# Patient Record
Sex: FEMALE | Race: WHITE | NOT HISPANIC OR LATINO
[De-identification: names, ages, dates, MRNs, and addresses within clinical notes are randomized per-mention and may not be internally consistent; named-entity substitution may affect disease eponyms.]

---

## 2019-04-10 PROBLEM — Z00.00 ENCOUNTER FOR PREVENTIVE HEALTH EXAMINATION: Status: ACTIVE | Noted: 2019-04-10

## 2019-04-12 ENCOUNTER — APPOINTMENT (OUTPATIENT)
Dept: THORACIC SURGERY | Facility: CLINIC | Age: 69
End: 2019-04-12
Payer: MEDICARE

## 2019-04-12 VITALS
OXYGEN SATURATION: 99 % | WEIGHT: 241 LBS | HEIGHT: 60 IN | DIASTOLIC BLOOD PRESSURE: 80 MMHG | HEART RATE: 76 BPM | SYSTOLIC BLOOD PRESSURE: 130 MMHG | BODY MASS INDEX: 47.32 KG/M2

## 2019-04-12 DIAGNOSIS — Z87.42 PERSONAL HISTORY OF OTHER DISEASES OF THE FEMALE GENITAL TRACT: ICD-10-CM

## 2019-04-12 DIAGNOSIS — Z86.39 PERSONAL HISTORY OF OTHER ENDOCRINE, NUTRITIONAL AND METABOLIC DISEASE: ICD-10-CM

## 2019-04-12 DIAGNOSIS — Z87.39 PERSONAL HISTORY OF OTHER DISEASES OF THE MUSCULOSKELETAL SYSTEM AND CONNECTIVE TISSUE: ICD-10-CM

## 2019-04-12 DIAGNOSIS — Z87.891 PERSONAL HISTORY OF NICOTINE DEPENDENCE: ICD-10-CM

## 2019-04-12 DIAGNOSIS — Z87.19 PERSONAL HISTORY OF OTHER DISEASES OF THE DIGESTIVE SYSTEM: ICD-10-CM

## 2019-04-12 DIAGNOSIS — Z86.79 PERSONAL HISTORY OF OTHER DISEASES OF THE CIRCULATORY SYSTEM: ICD-10-CM

## 2019-04-12 DIAGNOSIS — Z87.898 PERSONAL HISTORY OF OTHER SPECIFIED CONDITIONS: ICD-10-CM

## 2019-04-12 DIAGNOSIS — R91.1 SOLITARY PULMONARY NODULE: ICD-10-CM

## 2019-04-12 PROCEDURE — 99204 OFFICE O/P NEW MOD 45 MIN: CPT

## 2019-04-12 RX ORDER — LOSARTAN POTASSIUM 100 MG/1
TABLET, FILM COATED ORAL
Refills: 0 | Status: ACTIVE | COMMUNITY

## 2019-04-12 RX ORDER — CARVEDILOL 25 MG/1
25 TABLET, FILM COATED ORAL
Refills: 0 | Status: ACTIVE | COMMUNITY

## 2019-04-12 RX ORDER — AMLODIPINE BESYLATE 5 MG/1
5 TABLET ORAL
Refills: 0 | Status: ACTIVE | COMMUNITY

## 2019-04-12 RX ORDER — ASPIRIN 81 MG
81 TABLET, DELAYED RELEASE (ENTERIC COATED) ORAL
Refills: 0 | Status: ACTIVE | COMMUNITY

## 2019-04-12 NOTE — PHYSICAL EXAM
[General Appearance - Alert] : alert [General Appearance - In No Acute Distress] : in no acute distress [PERRL With Normal Accommodation] : pupils were equal in size, round, and reactive to light [Sclera] : the sclera and conjunctiva were normal [Extraocular Movements] : extraocular movements were intact [Outer Ear] : the ears and nose were normal in appearance [Oropharynx] : the oropharynx was normal [Neck Cervical Mass (___cm)] : no neck mass was observed [Neck Appearance] : the appearance of the neck was normal [Jugular Venous Distention Increased] : there was no jugular-venous distention [Thyroid Nodule] : there were no palpable thyroid nodules [Thyroid Diffuse Enlargement] : the thyroid was not enlarged [Heart Rate And Rhythm] : heart rate was normal and rhythm regular [Auscultation Breath Sounds / Voice Sounds] : lungs were clear to auscultation bilaterally [Murmurs] : no murmurs [Heart Sounds] : normal S1 and S2 [Heart Sounds Gallop] : no gallops [Heart Sounds Pericardial Friction Rub] : no pericardial rub [Examination Of The Chest] : the chest was normal in appearance [Diminished Respiratory Excursion] : normal chest expansion [Chest Visual Inspection Thoracic Asymmetry] : no chest asymmetry [Bowel Sounds] : normal bowel sounds [Abdomen Tenderness] : non-tender [Abdomen Mass (___ Cm)] : no abdominal mass palpated [Abdomen Soft] : soft [Cervical Lymph Nodes Enlarged Anterior Bilaterally] : anterior cervical [Supraclavicular Lymph Nodes Enlarged Bilaterally] : supraclavicular [Cervical Lymph Nodes Enlarged Posterior Bilaterally] : posterior cervical [Axillary Lymph Nodes Enlarged Bilaterally] : axillary [Femoral Lymph Nodes Enlarged Bilaterally] : femoral [Inguinal Lymph Nodes Enlarged Bilaterally] : inguinal [No CVA Tenderness] : no ~M costovertebral angle tenderness [Nail Clubbing] : no clubbing  or cyanosis of the fingernails [Abnormal Walk] : normal gait [No Spinal Tenderness] : no spinal tenderness [Musculoskeletal - Swelling] : no joint swelling seen [Skin Color & Pigmentation] : normal skin color and pigmentation [Motor Tone] : muscle strength and tone were normal [] : no rash [Skin Turgor] : normal skin turgor [Deep Tendon Reflexes (DTR)] : deep tendon reflexes were 2+ and symmetric [Sensation] : the sensory exam was normal to light touch and pinprick [No Focal Deficits] : no focal deficits [Oriented To Time, Place, And Person] : oriented to person, place, and time [Affect] : the affect was normal [Impaired Insight] : insight and judgment were intact

## 2019-04-16 NOTE — HISTORY OF PRESENT ILLNESS
[FreeTextEntry1] : This is a 67 y/o F referred to our office after CT chest demonstrated incidental finding of 1.9 cm partialy solid ANNY nodule.  \par \par She had seen her PCP for routine visit and was sent for Ca scoring.  Imaging demonstrated lesion and patient was referred to Dr. Land for consultation.  He ultimately sent her for PET which showed no abnormal metabolic activity.  She was then referred to our office.\par \par Patient is currently asymptomatic.

## 2019-04-16 NOTE — ASSESSMENT
[FreeTextEntry1] : This is a 67 y/o F with pmhx of UC, HTN, obesity now with incidental finding of ANNY lung nodule.  All clinical options have been discussed with the patient and her .  Given the fact that the PET did not demonstrate any hypermetabolic lesions it is reasonable to repeat the CT chest in 3 months to evaluate for growth of the lesion.   If the nodule remains the same size or is enlarging she has expressed interest in scheduling a resection at that time. \par \par We will see the patient in the office after the ct scan in three months.

## 2019-04-16 NOTE — CONSULT LETTER
[Dear  ___] : Dear  [unfilled], [( Thank you for referring [unfilled] for consultation for _____ )] : Thank you for referring [unfilled] for consultation for [unfilled] [Please see my note below.] : Please see my note below. [Sincerely,] : Sincerely, [DrElissa  ___] : Dr. ULLOA [FreeTextEntry3] : Graeme Streeter MD\par Thoracic Surgery\par [facility]

## 2019-04-16 NOTE — DATA REVIEWED
[FreeTextEntry1] : PET: 4/1/19:  No abnormal metabolic activity associate dwith the clustered nodules in the posterolateral left upper lobe.\par

## 2019-06-28 ENCOUNTER — APPOINTMENT (OUTPATIENT)
Dept: THORACIC SURGERY | Facility: CLINIC | Age: 69
End: 2019-06-28
Payer: MEDICARE

## 2019-06-28 PROCEDURE — 99214 OFFICE O/P EST MOD 30 MIN: CPT

## 2019-06-28 NOTE — ASSESSMENT
[FreeTextEntry1] : This is a 69 y/o F with pmhx of UC, HTN, obesity now with incidental finding of ANNY lung nodule. All clinical options have been discussed with the patient and her . Given the fact that the PET did not demonstrate any hypermetabolic lesions it is reasonable to repeat the CT chest in 3 months to evaluate for growth of the lesion. If the nodule remains the same size or is enlarging she has expressed interest in scheduling a resection at that time. \par \par Lesion remains stable, there is a question of avm.  This was discussed with patient.  Will recommend repeat ct chest with IV contrast in 6 months, cta.

## 2019-06-28 NOTE — HISTORY OF PRESENT ILLNESS
[FreeTextEntry1] : This is a 69 y/o F referred to our office after CT chest demonstrated incidental finding of 1.9 cm partialy solid ANNY nodule. \par \par She had seen her PCP for routine visit and was sent for Ca scoring. Imaging demonstrated lesion and patient was referred to Dr. Land for consultation. He ultimately sent her for PET which showed no abnormal metabolic activity. She was then referred to our office.\par \par Patient is currently asymptomatic.\par \par Repeat ct chest shows no change in nodule and concern for possible av malformation.  No other abnormalities seen.

## 2019-06-28 NOTE — PHYSICAL EXAM
[Sclera] : the sclera and conjunctiva were normal [PERRL With Normal Accommodation] : pupils were equal in size, round, and reactive to light [Extraocular Movements] : extraocular movements were intact [Neck Appearance] : the appearance of the neck was normal [Neck Cervical Mass (___cm)] : no neck mass was observed [Jugular Venous Distention Increased] : there was no jugular-venous distention [Thyroid Diffuse Enlargement] : the thyroid was not enlarged [Thyroid Nodule] : there were no palpable thyroid nodules [Auscultation Breath Sounds / Voice Sounds] : lungs were clear to auscultation bilaterally [Heart Rate And Rhythm] : heart rate was normal and rhythm regular [Heart Sounds] : normal S1 and S2 [Heart Sounds Gallop] : no gallops [Murmurs] : no murmurs [Heart Sounds Pericardial Friction Rub] : no pericardial rub [Examination Of The Chest] : the chest was normal in appearance [Chest Visual Inspection Thoracic Asymmetry] : no chest asymmetry [Diminished Respiratory Excursion] : normal chest expansion [Bowel Sounds] : normal bowel sounds [Abdomen Soft] : soft [Abdomen Tenderness] : non-tender [Abdomen Mass (___ Cm)] : no abdominal mass palpated [Cervical Lymph Nodes Enlarged Posterior Bilaterally] : posterior cervical [Cervical Lymph Nodes Enlarged Anterior Bilaterally] : anterior cervical [Supraclavicular Lymph Nodes Enlarged Bilaterally] : supraclavicular [Axillary Lymph Nodes Enlarged Bilaterally] : axillary [Femoral Lymph Nodes Enlarged Bilaterally] : femoral [Inguinal Lymph Nodes Enlarged Bilaterally] : inguinal [Skin Color & Pigmentation] : normal skin color and pigmentation [Skin Turgor] : normal skin turgor [] : no rash [Deep Tendon Reflexes (DTR)] : deep tendon reflexes were 2+ and symmetric [Sensation] : the sensory exam was normal to light touch and pinprick [No Focal Deficits] : no focal deficits

## 2020-01-06 NOTE — PHYSICAL EXAM
[Sclera] : the sclera and conjunctiva were normal [PERRL With Normal Accommodation] : pupils were equal in size, round, and reactive to light [Extraocular Movements] : extraocular movements were intact [Neck Appearance] : the appearance of the neck was normal [Neck Cervical Mass (___cm)] : no neck mass was observed [Jugular Venous Distention Increased] : there was no jugular-venous distention [Thyroid Diffuse Enlargement] : the thyroid was not enlarged [Thyroid Nodule] : there were no palpable thyroid nodules [Auscultation Breath Sounds / Voice Sounds] : lungs were clear to auscultation bilaterally [Heart Sounds] : normal S1 and S2 [Heart Rate And Rhythm] : heart rate was normal and rhythm regular [Heart Sounds Gallop] : no gallops [Murmurs] : no murmurs [Examination Of The Chest] : the chest was normal in appearance [Heart Sounds Pericardial Friction Rub] : no pericardial rub [Chest Visual Inspection Thoracic Asymmetry] : no chest asymmetry [Diminished Respiratory Excursion] : normal chest expansion [Bowel Sounds] : normal bowel sounds [Abdomen Soft] : soft [Abdomen Tenderness] : non-tender [Cervical Lymph Nodes Enlarged Posterior Bilaterally] : posterior cervical [Abdomen Mass (___ Cm)] : no abdominal mass palpated [Cervical Lymph Nodes Enlarged Anterior Bilaterally] : anterior cervical [Supraclavicular Lymph Nodes Enlarged Bilaterally] : supraclavicular [Femoral Lymph Nodes Enlarged Bilaterally] : femoral [Axillary Lymph Nodes Enlarged Bilaterally] : axillary [Inguinal Lymph Nodes Enlarged Bilaterally] : inguinal [No CVA Tenderness] : no ~M costovertebral angle tenderness [No Spinal Tenderness] : no spinal tenderness [Musculoskeletal - Swelling] : no joint swelling seen [Abnormal Walk] : normal gait [Nail Clubbing] : no clubbing  or cyanosis of the fingernails [Motor Tone] : muscle strength and tone were normal [Skin Turgor] : normal skin turgor [Skin Color & Pigmentation] : normal skin color and pigmentation [] : no rash [Sensation] : the sensory exam was normal to light touch and pinprick [Deep Tendon Reflexes (DTR)] : deep tendon reflexes were 2+ and symmetric [No Focal Deficits] : no focal deficits [Oriented To Time, Place, And Person] : oriented to person, place, and time [Impaired Insight] : insight and judgment were intact [Affect] : the affect was normal

## 2020-01-07 ENCOUNTER — APPOINTMENT (OUTPATIENT)
Dept: THORACIC SURGERY | Facility: CLINIC | Age: 70
End: 2020-01-07
Payer: MEDICARE

## 2020-01-07 PROCEDURE — 99213 OFFICE O/P EST LOW 20 MIN: CPT

## 2020-01-08 NOTE — HISTORY OF PRESENT ILLNESS
[FreeTextEntry1] : 69 y/o F referred to our office after 1 year ago after CT chest demonstrated incidental finding of 1.9 cm partially solid ANNY nodule. \par \par She had seen her PCP for routine visit and was sent for Ca scoring. Imaging demonstrated lesion and patient was referred to Dr. Land for consultation. He ultimately sent her for PET in April 2019 which showed no abnormal metabolic activity. \par \par Repeat ct chest in June 2019 showed no change in nodule.  Repeat CT chest done last week also demonstrates no interval change in size of nodule.\par \par Patient is asymptomatic at this time.

## 2020-01-08 NOTE — ASSESSMENT
[FreeTextEntry1] : This is a 67 y/o F with pmhx of UC, HTN, obesity now with incidental finding of ANNY lung nodule which has no changed in size since April 2019 and demonstrates no hypermetabolic activity.Patient has been instructed to undergo CTA for further follow up in 6 months time.  She expresses understanding and agrees with the plan.

## 2020-01-08 NOTE — CONSULT LETTER
[Consult Letter:] : I had the pleasure of evaluating your patient, [unfilled]. [Dear  ___] : Dear  [unfilled], [Please see my note below.] : Please see my note below. [Consult Closing:] : Thank you very much for allowing me to participate in the care of this patient.  If you have any questions, please do not hesitate to contact me. [Sincerely,] : Sincerely, [FreeTextEntry3] : Graeme Streeter MD \par Thoracic Surgery\par [facility]

## 2020-01-08 NOTE — DATA REVIEWED
[FreeTextEntry1] : CT Chest 1/2/2020:  Again a lobulated ANNY lesion demonstrating multiple nodular components and intervening lucencies again with associated mild architectural distortion and slight tenting of the adjacent major fissure.  It measures 1.9 x 1.8 x 1.7 cm in size and is unchanged since prior study.  There are again 2 pulmonary vessels coursing from the left hilum to the lesion, the possibility of a vascular malformation should be considered.

## 2021-04-16 ENCOUNTER — APPOINTMENT (OUTPATIENT)
Dept: VASCULAR SURGERY | Facility: CLINIC | Age: 71
End: 2021-04-16
Payer: MEDICARE

## 2021-04-16 DIAGNOSIS — I83.899 VARICOSE VEINS OF UNSPECIFIED LOWER EXTREMITY WITH OTHER COMPLICATIONS: ICD-10-CM

## 2021-04-16 PROCEDURE — 99072 ADDL SUPL MATRL&STAF TM PHE: CPT

## 2021-04-16 PROCEDURE — 93971 EXTREMITY STUDY: CPT

## 2021-04-16 PROCEDURE — 99204 OFFICE O/P NEW MOD 45 MIN: CPT

## 2021-04-16 NOTE — PHYSICAL EXAM
[Normal Breath Sounds] : Normal breath sounds [Normal Heart Sounds] : normal heart sounds [Normal Rate and Rhythm] : normal rate and rhythm [2+] : left 2+ [Ankle Swelling (On Exam)] : present [Ankle Swelling Bilaterally] : bilaterally  [Varicose Veins Of Lower Extremities] : bilaterally [] : bilaterally [JVD] : no jugular venous distention  [Calm] : calm [de-identified] : NAD. Awake and alert.  Overweight. [de-identified] : NCAT  [de-identified] : Soft, NT, ND. No guarding. No palpable masses. No HSM [de-identified] : Normal muscle bulk and tone. [de-identified] : Bilateral large varicose veins greater than 3 mm in diameter.  Significant venous stasis changes with skin discoloration, hemochromatosis.  Healing right medial malleolus wound with a scab. [de-identified] : AAOx3, CN II-XII intact. Strength 5/5 in all 4 extremities. Sensation intact throughout.

## 2021-04-16 NOTE — REVIEW OF SYSTEMS
[As Noted in HPI] : as noted in HPI [Skin Wound] : skin wound [Negative] : Heme/Lymph [Skin Lesions] : no skin lesions

## 2021-04-16 NOTE — HISTORY OF PRESENT ILLNESS
[FreeTextEntry1] : 69 yo F with PMhx of hypertension, hyperlipidemia, obesity, extensive bilateral varicose veins with venous insufficiency presented with recurrent bleeding from right lower extremity varicose vein.  Patient states that she had history longstanding history of venous insufficiency and multiple interventions previously performed for bilateral lower extremity varicose veins.  On April 5 she developed significant bleeding from small varicose veins at the right ankle.  The bleeding was stopped with compression and sterile dressing was applied.  Patient experienced similar experience similar symptoms a few days ago again, requiring EMS intervention.  Since that episode patient performed leg compression and local wound care with Xeroform.\par \par Patient has extensive family history of bilateral lower extremity varicose veins in multiple family members including her mother.  She has been compliant with his compressive socks years without substantial relief of her symptoms.,  However symptoms substantially exacerbated in recent time.\par \par Prior to this episode patient had persistent burning tingling numbness and discomfort in bilateral lower extremity varicose veins.  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

## 2021-04-16 NOTE — ASSESSMENT
[FreeTextEntry1] : Patient is a 70-year-old female with bilateral lower extremity varicose veins not amenable to nonoperative management and progressively worse.  Patient experienced recently recurrent episodes of bleeding from a varicose vein of right lower extremity.\par \par Given progressive given progressive nature of the disease process we will proceed with radiofrequency ablation of refluxing greater and lesser saphenous vein on the right.  Patient will continue wound care.

## 2021-04-16 NOTE — PROCEDURE
[FreeTextEntry1] : Venous ultrasound was performed of right lower extremity demonstrated greater saphenous vein as well as lesser saphenous vein reflux.

## 2021-05-04 ENCOUNTER — APPOINTMENT (OUTPATIENT)
Dept: VASCULAR SURGERY | Facility: CLINIC | Age: 71
End: 2021-05-04
Payer: MEDICARE

## 2021-05-04 VITALS
BODY MASS INDEX: 47.32 KG/M2 | HEIGHT: 60 IN | SYSTOLIC BLOOD PRESSURE: 146 MMHG | DIASTOLIC BLOOD PRESSURE: 72 MMHG | HEART RATE: 76 BPM | WEIGHT: 241 LBS

## 2021-05-04 PROCEDURE — 99072 ADDL SUPL MATRL&STAF TM PHE: CPT

## 2021-05-04 PROCEDURE — 36475 ENDOVENOUS RF 1ST VEIN: CPT | Mod: RT

## 2021-05-04 NOTE — PROCEDURE
[FreeTextEntry1] : L GSV RFA  [FreeTextEntry2] : Symptomatic R GSV REFLUX with bleeding varicose veins  [FreeTextEntry3] : The patient was seen in the waiting room where the consent was obtained. He was then taken to the procedure room where a timeout was called to verify her identity and the laterality of the procedure. The patient's right leg was then interrogated under ultrasound and an appropriate place for cannulation was identified. Her right leg was then prepped and draped in the standard fashion with chloroprep. Under ultrasound guidance the patient was given an injection of 1% plain lidocaine in the proximal calf. Access was then obtained with a 7 FR sheath in the standard fashion using a micropuncture technique. RFA catheter was placed to the SFJ and withdrawn 3.0 cm from the junction. Patient was then given tumescence around the saphenous vein under ultrasound guidance. The ablation was then performed in the standard fashion Using a total of 8 cycles. catheter and sheath were withdrawn. Complete hemostasis was achieved with manual compression. Steri strips were applied to catheter insertion site. Leg was wrapped in Kerlix and an ace wrap. Patient tolerated the procedure well. He  verbalized understanding of post-procedure instructions. The patient was discharged in stable condition.\par

## 2021-05-11 ENCOUNTER — APPOINTMENT (OUTPATIENT)
Dept: VASCULAR SURGERY | Facility: CLINIC | Age: 71
End: 2021-05-11
Payer: MEDICARE

## 2021-05-11 PROCEDURE — 99072 ADDL SUPL MATRL&STAF TM PHE: CPT

## 2021-05-11 PROCEDURE — 93971 EXTREMITY STUDY: CPT

## 2021-05-11 PROCEDURE — 99213 OFFICE O/P EST LOW 20 MIN: CPT

## 2021-05-12 NOTE — PROCEDURE
[FreeTextEntry1] : Venous ultrasound was performed of right lower extremity demonstrated occluded right greater saphenous vein.  No thrombosis of deep venous system.

## 2021-05-12 NOTE — HISTORY OF PRESENT ILLNESS
[FreeTextEntry1] : 71 yo F with PMhx of hypertension, hyperlipidemia, obesity, extensive bilateral varicose veins with venous insufficiency presented with recurrent bleeding from right lower extremity varicose vein.  Patient states that she had history longstanding history of venous insufficiency and multiple interventions previously performed for bilateral lower extremity varicose veins.  On April 5 she developed significant bleeding from small varicose veins at the right ankle.  The bleeding was stopped with compression and sterile dressing was applied.  Patient experienced similar experience similar symptoms a few days ago again, requiring EMS intervention.  Since that episode patient performed leg compression and local wound care with Xeroform.\par \par Patient has extensive family history of bilateral lower extremity varicose veins in multiple family members including her mother.  She has been compliant with his compressive socks years without substantial relief of her symptoms.,  However symptoms substantially exacerbated in recent time.\par \par Prior to this episode patient had persistent burning tingling numbness and discomfort in bilateral lower extremity varicose veins.  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 [de-identified] : 70-year-old female now status post right greater saphenous vein radiofrequency ablation postop day 7.  Patient is doing well.  She ambulates daily.  She is compliant with leg compression.  Leg feels significantly better.  No signs of bleeding from the previously bleeding varicose veins.  Some edema persists.

## 2021-05-12 NOTE — PHYSICAL EXAM
[Normal Breath Sounds] : Normal breath sounds [Normal Heart Sounds] : normal heart sounds [Normal Rate and Rhythm] : normal rate and rhythm [2+] : left 2+ [Ankle Swelling (On Exam)] : present [Ankle Swelling Bilaterally] : bilaterally  [Varicose Veins Of Lower Extremities] : present [] : bilaterally [Calm] : calm [JVD] : no jugular venous distention  [de-identified] : NAD. Awake and alert.  Overweight. [de-identified] : NCAT  [de-identified] : Soft, NT, ND. No guarding. No palpable masses. No HSM [de-identified] : Normal muscle bulk and tone. [de-identified] : Bilateral large varicose veins greater than 3 mm in diameter.  Significant venous stasis changes with skin discoloration, hemochromatosis.  Healed malleolus wound with a scab. [de-identified] : AAOx3, CN II-XII intact. Strength 5/5 in all 4 extremities. Sensation intact throughout.

## 2021-05-12 NOTE — ASSESSMENT
[FreeTextEntry1] : Patient is a 70-year-old female with bilateral lower extremity varicose veins not amenable to nonoperative management and progressively worse.  Patient experienced recently recurrent episodes of bleeding from a varicose vein of right lower extremity.\par Patient is now status post radiofrequency ablation of right greater saphenous vein.  We will proceed with lesser saphenous vein ablation.  Patient will continue leg compression and elevation.

## 2021-07-20 ENCOUNTER — APPOINTMENT (OUTPATIENT)
Dept: VASCULAR SURGERY | Facility: CLINIC | Age: 71
End: 2021-07-20
Payer: MEDICARE

## 2021-07-20 VITALS — SYSTOLIC BLOOD PRESSURE: 153 MMHG | HEART RATE: 89 BPM | DIASTOLIC BLOOD PRESSURE: 84 MMHG

## 2021-07-20 VITALS — DIASTOLIC BLOOD PRESSURE: 82 MMHG | SYSTOLIC BLOOD PRESSURE: 149 MMHG | HEART RATE: 86 BPM

## 2021-07-20 PROCEDURE — 36475 ENDOVENOUS RF 1ST VEIN: CPT | Mod: RT

## 2021-07-20 PROCEDURE — 99072 ADDL SUPL MATRL&STAF TM PHE: CPT

## 2021-07-20 NOTE — PROCEDURE
[FreeTextEntry1] : R SSV RFA  [FreeTextEntry2] : symptomatic vvs and venous reflux.  [FreeTextEntry3] : The patient was seen in the waiting room where the consent was obtained.She  was then taken to the procedure room where a timeout was called to verify her identity and the laterality of the procedure. The patient's right leg was then interrogated under ultrasound and an appropriate place for cannulation was identified. Her right leg was then prepped and draped in the standard fashion with chloroprep. Under ultrasound guidance the patient was given an injection of 1% plain lidocaine in the proximal calf. Access was then obtained with a 7 FR sheath in the standard fashion using a micropuncture technique. RFA catheter was placed to the SPJ and withdrawn 3.0 cm from the junction. Patient was then given tumescence around the saphenous vein under ultrasound guidance. The ablation was then performed in the standard fashion Using a total of 4 cycles. catheter and sheath were withdrawn. Complete hemostasis was achieved with manual compression. Steri strips were applied to catheter insertion site. Leg was wrapped in Kerlix and an ace wrap. Patient tolerated the procedure well. He  verbalized understanding of post-procedure instructions. The patient was discharged in stable condition.\par

## 2021-08-03 ENCOUNTER — APPOINTMENT (OUTPATIENT)
Dept: VASCULAR SURGERY | Facility: CLINIC | Age: 71
End: 2021-08-03
Payer: MEDICARE

## 2021-08-03 VITALS — HEART RATE: 86 BPM | SYSTOLIC BLOOD PRESSURE: 166 MMHG | DIASTOLIC BLOOD PRESSURE: 88 MMHG

## 2021-08-03 PROCEDURE — 99213 OFFICE O/P EST LOW 20 MIN: CPT

## 2021-08-03 PROCEDURE — 93971 EXTREMITY STUDY: CPT

## 2021-08-03 NOTE — PHYSICAL EXAM
[JVD] : no jugular venous distention  [Normal Breath Sounds] : Normal breath sounds [Normal Heart Sounds] : normal heart sounds [Normal Rate and Rhythm] : normal rate and rhythm [2+] : left 2+ [Ankle Swelling (On Exam)] : present [Ankle Swelling Bilaterally] : bilaterally  [Varicose Veins Of Lower Extremities] : bilaterally [] : bilaterally [Calm] : calm [de-identified] : NAD. Awake and alert.  Overweight. [de-identified] : Soft, NT, ND. No guarding. No palpable masses. No HSM [de-identified] : NCAT  [de-identified] : Normal muscle bulk and tone. [de-identified] : Bilateral large varicose veins greater than 3 mm in diameter.  Significant venous stasis changes with skin discoloration, hemochromatosis.  Healed malleolus wound with a scab. [de-identified] : AAOx3, CN II-XII intact. Strength 5/5 in all 4 extremities. Sensation intact throughout.

## 2021-08-03 NOTE — HISTORY OF PRESENT ILLNESS
[FreeTextEntry1] : 69 yo F with PMhx of hypertension, hyperlipidemia, obesity, extensive bilateral varicose veins with venous insufficiency presented with recurrent bleeding from right lower extremity varicose vein.  Patient states that she had history longstanding history of venous insufficiency and multiple interventions previously performed for bilateral lower extremity varicose veins.  On April 5 she developed significant bleeding from small varicose veins at the right ankle.  The bleeding was stopped with compression and sterile dressing was applied.  Patient experienced similar experience similar symptoms a few days ago again, requiring EMS intervention.  Since that episode patient performed leg compression and local wound care with Xeroform.\par \par Patient has extensive family history of bilateral lower extremity varicose veins in multiple family members including her mother.  She has been compliant with his compressive socks years without substantial relief of her symptoms.,  However symptoms substantially exacerbated in recent time.\par \par Prior to this episode patient had persistent burning tingling numbness and discomfort in bilateral lower extremity varicose veins.  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 [de-identified] : 70-year-old female now status post right greater  and lesser saphenous vein radiofrequency ablation.   Patient is doing well.  She ambulates daily.  She is compliant with leg compression.  Leg feels significantly better.  No signs of bleeding from the previously bleeding varicose veins.  Some edema persists.

## 2021-08-03 NOTE — PROCEDURE
[FreeTextEntry1] : Venous ultrasound was performed of right lower extremity demonstrated occluded right greater and lesser saphenous vein.  No thrombosis of deep venous system.\par \par Occluded L GSV

## 2021-08-03 NOTE — REVIEW OF SYSTEMS
[As Noted in HPI] : as noted in HPI [Skin Lesions] : no skin lesions [Skin Wound] : skin wound [Negative] : Heme/Lymph

## 2021-08-03 NOTE — ASSESSMENT
[FreeTextEntry1] : Patient is a 70-year-old female with bilateral lower extremity varicose veins not amenable to nonoperative management and progressively worse.  Patient experienced recently recurrent episodes of bleeding from a varicose vein of right lower extremity.\par Patient is now status post radiofrequency ablation of right  saphenous vein.   Patient will continue leg compression and elevation.\par \par Follow up in the office in 1 year or sooner as needed.